# Patient Record
Sex: MALE | Race: BLACK OR AFRICAN AMERICAN | NOT HISPANIC OR LATINO | URBAN - METROPOLITAN AREA
[De-identification: names, ages, dates, MRNs, and addresses within clinical notes are randomized per-mention and may not be internally consistent; named-entity substitution may affect disease eponyms.]

---

## 2024-04-22 ENCOUNTER — EMERGENCY (EMERGENCY)
Facility: HOSPITAL | Age: 54
LOS: 1 days | Discharge: HOME | End: 2024-04-22
Attending: STUDENT IN AN ORGANIZED HEALTH CARE EDUCATION/TRAINING PROGRAM | Admitting: STUDENT IN AN ORGANIZED HEALTH CARE EDUCATION/TRAINING PROGRAM
Payer: SELF-PAY

## 2024-04-22 VITALS
SYSTOLIC BLOOD PRESSURE: 130 MMHG | DIASTOLIC BLOOD PRESSURE: 77 MMHG | OXYGEN SATURATION: 96 % | HEART RATE: 84 BPM | TEMPERATURE: 98 F | RESPIRATION RATE: 18 BRPM | WEIGHT: 179.9 LBS

## 2024-04-22 VITALS
HEART RATE: 70 BPM | TEMPERATURE: 98 F | DIASTOLIC BLOOD PRESSURE: 79 MMHG | SYSTOLIC BLOOD PRESSURE: 130 MMHG | RESPIRATION RATE: 16 BRPM | OXYGEN SATURATION: 100 %

## 2024-04-22 PROCEDURE — 99284 EMERGENCY DEPT VISIT MOD MDM: CPT

## 2024-04-22 PROCEDURE — 71250 CT THORAX DX C-: CPT | Mod: 26,MC

## 2024-04-22 RX ORDER — OXYCODONE HYDROCHLORIDE 5 MG/1
1 TABLET ORAL
Qty: 12 | Refills: 0
Start: 2024-04-22 | End: 2024-04-24

## 2024-04-22 RX ORDER — IBUPROFEN 200 MG
600 TABLET ORAL ONCE
Refills: 0 | Status: COMPLETED | OUTPATIENT
Start: 2024-04-22 | End: 2024-04-22

## 2024-04-22 RX ADMIN — Medication 600 MILLIGRAM(S): at 02:56

## 2024-04-22 NOTE — ED ADULT NURSE NOTE - NSFALLRISKINTERV_ED_ALL_ED

## 2024-04-22 NOTE — ED ADULT NURSE NOTE - OBJECTIVE STATEMENT
Break coverage RN: Pt received to rm 19, A&Ox4. Pt endorsing right sided rib pain, worse upon taking a deep breath s/p trip and fall down 9 steps. Pt denies LOC, blood thiner use, head strike. No injury/deformity noted. Pt states pain is tolerable at this time. Pt denies chest pain, SOB, N/V/D, weakness, dizziness, blurry vision. Respirations even and unlabored. Pending XRay at this time. Safety maintained, bed locked in lowest position. Call bell within reach. Report to be given to primary RN Kya. Break coverage RN: Pt received to rm 19, A&Ox4. Pt endorsing right sided rib pain, worse upon taking a deep breath s/p trip and fall down 9 steps. Pt denies LOC, blood thiner use, head strike. No injury/deformity noted. Pt states pain is tolerable at this time. Pt denies chest pain, SOB, N/V/D, weakness, dizziness, blurry vision. Respirations even and unlabored. Pending CT at this time. Safety maintained, bed locked in lowest position. Call bell within reach. Report to be given to primary RN Kya.

## 2024-04-22 NOTE — ED PROVIDER NOTE - NSFOLLOWUPINSTRUCTIONS_ED_ALL_ED_FT
Rib Fracture(s)    A rib fracture is a break or crack in one of the bones of the ribs. The ribs are a group of long, curved bones that wrap around your chest and attach to your spine. A broken or cracked rib is often painful, but most do not cause other problems and heal on their own over time. Symptoms include pain when you breath or cough or pain when pressing over the area. Breathing exercises will help keep your lungs inflated and prevent lung collapse or infection. Please use incentive spirometry 10 times per hour while awake.    SEEK IMMEDIATE MEDICAL CARE IF YOU HAVE ANY OF THE FOLLOWING SYMPTOMS: fever, shortness of breath, coughing up blood, abdominal pain, nausea or vomiting, pain not controlled with medications.

## 2024-04-22 NOTE — ED PROVIDER NOTE - CLINICAL SUMMARY MEDICAL DECISION MAKING FREE TEXT BOX
Patient is a 54-year-old male with no past medical history who presented to ED with right-sided rib pain. Pt with superficial abrasion to right sided ribs. Plan for CT scan to eval for rib fracture and will reassess. Pt declining pain medication at this time.

## 2024-04-22 NOTE — ED ADULT NURSE NOTE - NS ED PATIENT SAFETY CONCERN
Normal
Impaired
Normal
Impaired
Normal
No

## 2024-04-22 NOTE — ED PROVIDER NOTE - OBJECTIVE STATEMENT
Patient is a 54-year-old male with no past medical history who presented to ED with right-sided rib pain.  Patient reports he fell down approximately 9 steps this evening and fell onto a wooden chair.  Patient reports he did not hit his head or lose consciousness.  Patient reports he is not on any blood thinners.  Patient reports he is only having pain to his right lower ribs.  Patient otherwise denies fevers, chills, chest pain, palpitations, shortness of breath, syncope, nausea, vomiting, vision changes.

## 2024-04-22 NOTE — ED PROVIDER NOTE - PROGRESS NOTE DETAILS
MD Keith: CT significant for mildly displaced right anterolateral ninth rib fracture w/o hemothorax and pneumothorax. Pt was re-evaluated at bedside, VSS, feeling better overall, pain controlled. Results were discussed with patient as well as return precautions and follow up plan with PCP. Incentive spirometry given. Time was taken to answer any questions that the patient had before providing them with discharge paperwork.

## 2024-04-22 NOTE — ED PROVIDER NOTE - PATIENT PORTAL LINK FT
You can access the FollowMyHealth Patient Portal offered by [facility] by registering at the following website: [email or web address]. By joining [facility]’s FollowMyHealth portal, you will also be able to view your health information using other applications (apps) compatible with our system.

## 2024-04-22 NOTE — ED ADULT TRIAGE NOTE - CHIEF COMPLAINT QUOTE
C/o fall. pt fell down ~9 steps and landed on barstool. landed on R rib cage, endorsing pain to area. worse when talking or breathing. denies head trauma, LOC or blood thinner use.